# Patient Record
(demographics unavailable — no encounter records)

---

## 2017-05-06 NOTE — US
EXAMINATION TYPE: US OB <= 14 wk fetus

 

DATE OF EXAM: 2017 1:55 PM

 

COMPARISON: NONE

 

CLINICAL HISTORY: Pain. pt had ultrasound one week ago at [facility] and showed empty gest sac; now heavy 
bleeding and passing large clots

 

EXAM PERFORMED:  Transabdominal (TA)

 

EXAM MEASUREMENTS:

 

GESTATIONAL AGE / DATING

 

Physician Established: not established

Dates by LMP:  unknown lmp

Dates by First Scan:  no prior here

Dates by Current Scan for:  no IUP seen

 

MATERNAL ANATOMY 

 

Uterus: 11.3 x 4.8 x 6.8cm

Endometrium: 1.7cm

Right Ovary: 2.4 x 1.7 x 1.0cm

Left Ovary: 2.0 x 1.3 x 2.0cm

Post CDS / Adnexa: wnl

Presence of free fluid: none seen

 

Thickened heterogeneous non-vascular endometrial debris.  No IUP seen.  Probable incomplete miscarria
ge.

 

 

 

IMPRESSION:

1. No intrauterine pregnancy identified at this time. Correlate for spontaneous .

2. Differential could include ectopic pregnancy an early intrauterine pregnancy. Correlation with bet
a-hCG is recommended.

## 2017-05-06 NOTE — ED
General Adult HPI





- General


Chief complaint: Vaginal Bleeding


Stated complaint: abd pain 8 weeks preg


Time Seen by Provider: 17 12:22


Source: patient, RN notes reviewed


Mode of arrival: ambulatory


Limitations: no limitations





- History of Present Illness


Initial comments: 





42-year-old female presents to the emergency department with a chief complaint 

of vaginal bleeding.  Patient was diagnosed with no fetal activity on .  

Patient states her last today she's noticed some increased vaginal bleeding she'

s passing clots.  Patient states she has had some lightheadedness with this and 

felt faint like she might pass out so she became concerned.  Patient states she 

has had some cramping with this.  atient states she was concerned due to her 

continued symptoms that she thought that she should be evaluated.Patient denies 

any recent fever, chills, shortness of breath, chest pain, back pain, nausea 

vomiting, numbness or tingling, dysuria or hematuria, constipation or diarrhea, 

headaches or visual changes, or any other current symptoms.





- Related Data


 Home Medications











 Medication  Instructions  Recorded  Confirmed


 


Pnv with Ca,No.72/Iron/FA 1 tab PO HS 17





[Prenatal Plus Tablet]   











 Allergies











Allergy/AdvReac Type Severity Reaction Status Date / Time


 


bupropion [From Wellbutrin] Allergy  Unknown Verified 17 12:10














Review of Systems


ROS Statement: 


Those systems with pertinent positive or pertinent negative responses have been 

documented in the HPI.





ROS Other: All systems not noted in ROS Statement are negative.





Past Medical History


Past Medical History: No Reported History


History of Any Multi-Drug Resistant Organisms: None Reported


Past Surgical History: No Surgical Hx Reported


Past Psychological History: No Psychological Hx Reported


Smoking Status: Current every day smoker


Past Alcohol Use History: None Reported


Past Drug Use History: None Reported





General Exam





- General Exam Comments


Initial Comments: 





General:  The patient is awake and alert, in no distress, and does not appear 

acutely ill. 


Eye:  Pupils are equal, round and reactive to light, extra-ocular movements are 

intact; there is normal conjunctiva bilaterally.  No signs of icterus.  


Ears, nose, mouth and throat:  There are moist mucous membranes and no oral 

lesions. 


Neck:  The neck is supple, there is no tenderness.


Cardiovascular:  There is a regular rate and rhythm. No murmur, rub or gallop 

is appreciated.


Respiratory:  Lungs are clear to auscultation, respirations are non-labored, 

breath sounds are equal.  No wheezes, stridor, rales, or rhonchi.


Gastrointestinal:  Soft, non-distended, non-tender abdomen without masses or 

organomegaly noted. There is no rebound or guarding present.  No CVA 

tenderness. Bowel sounds are unremarkable.


Back:  There is no tenderness to palpation in the midline. There is no obvious 

deformity. No rashes noted. 


Musculoskeletal:  Normal ROM, no tenderness, There is no pedal edema. There is 

no calf tenderness or swelling. Sensation intact. Pulses equal bilaterally 2+.  


Neurological:  CN II-XII intact, There are no obvious motor or sensory 

deficits. Coordination appears grossly intact. Speech is normal.


Skin:  Skin is warm and dry and no rashes or lesions are noted. 


Psychiatric:  Cooperative, appropriate mood & affect, normal judgment.  





Limitations: no limitations





Course


 Vital Signs











  17





  11:10 13:20


 


Temperature 99.1 F 


 


Pulse Rate 81 


 


Pulse Rate [  67





Radial]  


 


Respiratory 16 





Rate  


 


Blood Pressure 98/52 


 


Blood Pressure  92/53





[Sitting]  


 


Blood Pressure  88/52





[Standing]  


 


Blood Pressure  88/47





[Supine]  


 


O2 Sat by Pulse 100 





Oximetry  














Medical Decision Making





- Medical Decision Making





42-year-old female presents for vaginal bleeding in pregnancy.  At this time 

patient's ultrasound and blood work was reviewed.  It appears that the patient 

did have a spontaneous .  At this time patient's pelvic exam showed 

minimal amount of bleeding.  A discussion follow-up with her OB/GYN.  We did 

discuss return parameters.  The simply like to go prior to giving a urine 

sample.  This time she will be discharged.  We discussed return parameters all 

patient's questions.  She stated that she understood and she has agreed with 

plan.  She will be discharged.





- Lab Data


Result diagrams: 


 17 12:50





 17 12:50


 Lab Results











  17 Range/Units





  12:50 12:50 12:50 


 


WBC   6.1   (3.8-10.6)  k/uL


 


RBC   3.61 L   (3.80-5.40)  m/uL


 


Hgb   11.6   (11.4-16.0)  gm/dL


 


Hct   35.6   (34.0-46.0)  %


 


MCV   98.7   (80.0-100.0)  fL


 


MCH   32.2   (25.0-35.0)  pg


 


MCHC   32.6   (31.0-37.0)  g/dL


 


RDW   13.1   (11.5-15.5)  %


 


Plt Count   194   (150-450)  k/uL


 


Neutrophils %   77   %


 


Lymphocytes %   17   %


 


Monocytes %   4   %


 


Eosinophils %   1   %


 


Basophils %   1   %


 


Neutrophils #   4.7   (1.3-7.7)  k/uL


 


Lymphocytes #   1.1   (1.0-4.8)  k/uL


 


Monocytes #   0.2   (0-1.0)  k/uL


 


Eosinophils #   0.0   (0-0.7)  k/uL


 


Basophils #   0.0   (0-0.2)  k/uL


 


Sodium    139  (137-145)  mmol/L


 


Potassium    4.2  (3.5-5.1)  mmol/L


 


Chloride    107  ()  mmol/L


 


Carbon Dioxide    27  (22-30)  mmol/L


 


Anion Gap    5  mmol/L


 


BUN    5 L  (7-17)  mg/dL


 


Creatinine    0.66  (0.52-1.04)  mg/dL


 


Est GFR (MDRD) Af Amer    >60  (>60 ml/min/1.73 sqM)  


 


Est GFR (MDRD) Non-Af    >60  (>60 ml/min/1.73 sqM)  


 


Glucose    93  (74-99)  mg/dL


 


Calcium    9.3  (8.4-10.2)  mg/dL


 


Total Bilirubin    0.4  (0.2-1.3)  mg/dL


 


AST    20  (14-36)  U/L


 


ALT    28  (9-52)  U/L


 


Alkaline Phosphatase    47  ()  U/L


 


Total Protein    6.7  (6.3-8.2)  g/dL


 


Albumin    4.1  (3.5-5.0)  g/dL


 


HCG, Quant    3998.7  mIU/mL


 


Blood Type  O Positive    


 


Blood Type Recheck  No    














- Radiology Data


Radiology results: report reviewed, image reviewed





Disposition


Clinical Impression: 


 Spontaneous 





Disposition: HOME SELF-CARE


Condition: Stable


Instructions:  Miscarriage (ED)


Additional Instructions: 


Please use medication as discussed. Please follow up with family doctor if 

symptoms have not improved over the next two days. Please return to the 

emergency room if your symptoms increase or worsen or for any other concerns. 


Referrals: 


Blanca Hanks MD [Primary Care Provider] - 1-2 days


Darya Clarke MD [STAFF PHYSICIAN] - 1-2 days


Time of Disposition: 14:42